# Patient Record
Sex: MALE | URBAN - METROPOLITAN AREA
[De-identification: names, ages, dates, MRNs, and addresses within clinical notes are randomized per-mention and may not be internally consistent; named-entity substitution may affect disease eponyms.]

---

## 2020-06-01 ENCOUNTER — OUT OF OFFICE VISIT (OUTPATIENT)
Dept: URBAN - METROPOLITAN AREA MEDICAL CENTER 23 | Facility: MEDICAL CENTER | Age: 55
End: 2020-06-01
Payer: COMMERCIAL

## 2020-06-01 DIAGNOSIS — R93.2 ABNORMAL CHOLANGIOGRAM: ICD-10-CM

## 2020-06-01 DIAGNOSIS — Z99.2 DEPENDENCE ON RENAL DIALYSIS: ICD-10-CM

## 2020-06-01 DIAGNOSIS — K83.01 PSC (PRIMARY SCLEROSING CHOLANGITIS): ICD-10-CM

## 2020-06-01 DIAGNOSIS — N18.6 ANEMIA DUE TO CHRONIC KIDNEY DISEASE, ON CHRONIC DIALYSIS: ICD-10-CM

## 2020-06-01 PROCEDURE — 99232 SBSQ HOSP IP/OBS MODERATE 35: CPT | Performed by: PHYSICIAN ASSISTANT

## 2020-07-20 ENCOUNTER — DASHBOARD ENCOUNTERS (OUTPATIENT)
Age: 55
End: 2020-07-20

## 2020-07-22 ENCOUNTER — WEB ENCOUNTER (OUTPATIENT)
Dept: URBAN - METROPOLITAN AREA CLINIC 54 | Facility: CLINIC | Age: 55
End: 2020-07-22

## 2020-07-22 ENCOUNTER — OFFICE VISIT (OUTPATIENT)
Dept: URBAN - METROPOLITAN AREA CLINIC 54 | Facility: CLINIC | Age: 55
End: 2020-07-22
Payer: COMMERCIAL

## 2020-07-22 DIAGNOSIS — K83.1 BILIARY STRICTURE: ICD-10-CM

## 2020-07-22 DIAGNOSIS — K83.01 PRIMARY SCLEROSING CHOLANGITIS: ICD-10-CM

## 2020-07-22 PROBLEM — 197441003 PRIMARY SCLEROSING CHOLANGITIS: Status: ACTIVE | Noted: 2020-07-22

## 2020-07-22 PROCEDURE — 3017F COLORECTAL CA SCREEN DOC REV: CPT | Performed by: INTERNAL MEDICINE

## 2020-07-22 PROCEDURE — G9903 PT SCRN TBCO ID AS NON USER: HCPCS | Performed by: INTERNAL MEDICINE

## 2020-07-22 PROCEDURE — G8417 CALC BMI ABV UP PARAM F/U: HCPCS | Performed by: INTERNAL MEDICINE

## 2020-07-22 PROCEDURE — 99202 OFFICE O/P NEW SF 15 MIN: CPT | Performed by: INTERNAL MEDICINE

## 2020-07-22 PROCEDURE — G8427 DOCREV CUR MEDS BY ELIG CLIN: HCPCS | Performed by: INTERNAL MEDICINE

## 2020-07-22 RX ORDER — INSULIN GLARGINE 100 [IU]/ML
AS DIRECTED INJECTION, SOLUTION SUBCUTANEOUS
Status: ACTIVE | COMMUNITY

## 2020-07-22 RX ORDER — PATIROMER 8.4 G/1
1 PACKET POWDER, FOR SUSPENSION ORAL
Status: ACTIVE | COMMUNITY

## 2020-07-22 RX ORDER — ISOSORBIDE MONONITRATE 10 MG/1
1 TABLET TABLET ORAL TWICE A DAY
Status: ACTIVE | COMMUNITY

## 2020-07-22 RX ORDER — SEVELAMER CARBONATE 800 MG/1
1 TABLET WITH MEALS TABLET, FILM COATED ORAL THREE TIMES A DAY
Status: ACTIVE | COMMUNITY

## 2020-07-22 RX ORDER — PANTOPRAZOLE SODIUM 40 MG/1
1 TABLET TABLET, DELAYED RELEASE ORAL ONCE A DAY
Status: ACTIVE | COMMUNITY

## 2020-07-22 RX ORDER — CARVEDILOL 12.5 MG/1
1 TABLET WITH FOOD TABLET, FILM COATED ORAL TWICE A DAY
Status: ACTIVE | COMMUNITY

## 2020-07-22 RX ORDER — MULTIVITAMIN WITH IRON
1 CAPSULE TABLET ORAL ONCE A DAY
Status: ACTIVE | COMMUNITY

## 2020-07-22 RX ORDER — BUMETANIDE 0.5 MG/1
1 TABLET PO TABLET ORAL BID
Status: ACTIVE | COMMUNITY

## 2020-07-22 RX ORDER — TURMERIC 400 MG
AS DIRECTED CAPSULE ORAL
Status: ACTIVE | COMMUNITY

## 2020-07-22 RX ORDER — AMLODIPINE BESYLATE 5 MG/1
1 TABLET TABLET ORAL ONCE A DAY
Status: ACTIVE | COMMUNITY

## 2020-07-22 RX ORDER — INSULIN LISPRO 100 [IU]/ML
AS DIRECTED INJECTION, SOLUTION INTRAVENOUS; SUBCUTANEOUS
Status: ACTIVE | COMMUNITY

## 2020-07-22 RX ORDER — URSODIOL 500 MG/1
1 TABLET TABLET ORAL THREE TIMES A DAY
Qty: 270 TABLET | Refills: 2 | OUTPATIENT
Start: 2020-07-22

## 2020-07-22 RX ORDER — PERPHENAZINE 2 MG
AS DIRECTED TABLET ORAL
Status: ACTIVE | COMMUNITY

## 2020-07-22 NOTE — HPI-OTHER HISTORIES
5/2020 hospital: HOSPITAL COURSE No notes on file * Severe sepsis (CMS/HCC)- (present on admission) Assessment & Plan Likely multi-factorial   Infected HD access as well as osteomyelitis and newly noted PSC   Continue with IV abx therapy per ID   Hyperbilirubinemia- (present on admission) Assessment & Plan Cholestatic hepatitis I suspect that medications maybe contributing in addition to bacteremia Of note, rocephin may contribute to microlithiasis as well Would plan for EUS with possible ERCP to further evaluate for obstructive etiology  If neg EUS perc C tube and liver bx for DILI    5.31.2020 S/p EUS and ERCP: 1.  Dominant stricture noted in the common bile duct in the lower end of the bile duct 2.  Multiple intrahepatic ductal strictures noted suggestive of a primary sclerosing cholangitis 3.  Pruning of the intrahepatic ducts noted suggestive of a primary sclerosing cholangitis 4.  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  Ampullary mass noted status post fine-needle aspiration was done which is about 1.5 cm x 1 cm 2.  Common bile duct is very narrow with a stricture in the common bile duct noted in the lower end of the common bile duct 3.  few lymph nodes of 5 mm and 6 mm noted in the peripancreatic area next of the head of the pancreas   In the setting of possible PSC, start DOLORES Continue with abx, and likely source of fevers/elevated LFTs multifactorial now with possible PSC Discussed with patient EUS/ERCP findings and need to follow with us on DC   Await bx results of ampullary mass Continue with abx as current   ESRD (end stage renal disease) on dialysis (CMS/HCC) Assessment & Plan complicated by MRSA bacteremia   -Continue hemodialysis per kidney care nephrology -Patient with PermCath in place -Patient is also status post right upper extremity fistula placement by [facility] vascular surgery on 5/27/2020   Osteomyelitis of left foot (CMS/HCC)- (present on admission) Assessment & Plan -This was also noted on a MRI back in 2019 -Suspect this is a chronic osteomyelitis of this area -Patient normally follows with Dr. Talbot of podiatry, unfortunately Dr. Talbot does not come to this hospital -Patient has been evaluated by the on-call podiatrist here, however the patient refuses to be operated on by anyone other than his primary podiatrist -Per the patient, this is been a chronic wound, that he feels has been improving over the last several months -Plan to continue local wound care -Follow-up with outpatient podiatrist on discharge   Cholecystitis, acute- (present on admission) Assessment & Plan Findings concerning for acalculous cholecystitis  Worsening leukocytosis Profound cholestasis   5.31.2020 Suspect elevated LFTs as well as fevers/cholestatic picture from newly noted likely PSC as findings on EUS/ERCP Would hold on further GB surgical intervention at this time   MRSA bacteremia- (present on admission) Assessment & Plan On broad spectrum abx therapy, s/p removal of previous HD access, now with Vasc cath

## 2020-07-22 NOTE — HPI-TODAY'S VISIT:
Follow-up of PSG with dominant biliary stricture and placement of 7 [place] stent on 5/30/2020.  He was jaundiced at that time and septic from a dialysis catheter.  Jaundice has resolved.  He was on ursodiol in the hospital but is not taking it at this time.  He denies fever or abdominal pain.  He is eating well.  Other problems include diabetes and chronic kidney disease on hemodialysis.  He has no active cardiac or respiratory problems.  Biopsies of a suspected ampullary mass revealed no cancer.  He has no ongoing infections.

## 2020-07-23 LAB
A/G RATIO: 0.9
ALBUMIN: 3.7
ALKALINE PHOSPHATASE: 436
ALT (SGPT): 8
AST (SGOT): 23
BASO (ABSOLUTE): 0.1
BASOS: 1
BILIRUBIN, TOTAL: 0.9
BUN/CREATININE RATIO: 11
BUN: 83
CALCIUM: 8.6
CARBON DIOXIDE, TOTAL: 22
CHLORIDE: 89
CREATININE: 7.84
EGFR IF AFRICN AM: 8
EGFR IF NONAFRICN AM: 7
EOS (ABSOLUTE): 0.2
EOS: 2
GLOBULIN, TOTAL: 4
GLUCOSE: 245
HEMATOCRIT: 31.9
HEMATOLOGY COMMENTS:: (no result)
HEMOGLOBIN: 10.4
IMMATURE CELLS: (no result)
IMMATURE GRANS (ABS): 0.1
IMMATURE GRANULOCYTES: 1
LYMPHS (ABSOLUTE): 0.9
LYMPHS: 8
MCH: 31.1
MCHC: 32.6
MCV: 96
MONOCYTES(ABSOLUTE): 1.4
MONOCYTES: 13
NEUTROPHILS (ABSOLUTE): 8.2
NEUTROPHILS: 75
NRBC: (no result)
PLATELETS: 350
POTASSIUM: 4.8
PROTEIN, TOTAL: 7.7
RBC: 3.34
RDW: 15.3
SODIUM: 136
WBC: 10.8

## 2020-08-17 ENCOUNTER — OFFICE VISIT (OUTPATIENT)
Dept: URBAN - METROPOLITAN AREA CLINIC 115 | Facility: CLINIC | Age: 55
End: 2020-08-17

## 2021-01-11 ENCOUNTER — TELEPHONE ENCOUNTER (OUTPATIENT)
Dept: URBAN - METROPOLITAN AREA CLINIC 54 | Facility: CLINIC | Age: 56
End: 2021-01-11

## 2021-04-28 ENCOUNTER — TELEPHONE ENCOUNTER (OUTPATIENT)
Dept: URBAN - METROPOLITAN AREA CLINIC 98 | Facility: CLINIC | Age: 56
End: 2021-04-28

## 2022-03-07 ENCOUNTER — OUT OF OFFICE VISIT (OUTPATIENT)
Dept: URBAN - METROPOLITAN AREA MEDICAL CENTER 23 | Facility: MEDICAL CENTER | Age: 57
End: 2022-03-07
Payer: COMMERCIAL

## 2022-03-07 DIAGNOSIS — R10.11 ABDOMINAL BURNING SENSATION IN RIGHT UPPER QUADRANT: ICD-10-CM

## 2022-03-07 DIAGNOSIS — N18.6 ANEMIA DUE TO CHRONIC KIDNEY DISEASE, ON CHRONIC DIALYSIS: ICD-10-CM

## 2022-03-07 DIAGNOSIS — Z99.2 DEPENDENCE ON PERITONEAL DIALYSIS: ICD-10-CM

## 2022-03-07 PROCEDURE — G8427 DOCREV CUR MEDS BY ELIG CLIN: HCPCS | Performed by: INTERNAL MEDICINE

## 2022-03-07 PROCEDURE — 99223 1ST HOSP IP/OBS HIGH 75: CPT | Performed by: INTERNAL MEDICINE

## 2022-03-08 ENCOUNTER — OUT OF OFFICE VISIT (OUTPATIENT)
Dept: URBAN - METROPOLITAN AREA MEDICAL CENTER 23 | Facility: MEDICAL CENTER | Age: 57
End: 2022-03-08
Payer: COMMERCIAL

## 2022-03-08 DIAGNOSIS — K80.50 BILE DUCT CALCULUS: ICD-10-CM

## 2022-03-08 PROCEDURE — 74328 X-RAY BILE DUCT ENDOSCOPY: CPT | Performed by: INTERNAL MEDICINE

## 2022-03-08 PROCEDURE — 43264 ERCP REMOVE DUCT CALCULI: CPT | Performed by: INTERNAL MEDICINE

## 2022-03-09 ENCOUNTER — OUT OF OFFICE VISIT (OUTPATIENT)
Dept: URBAN - METROPOLITAN AREA MEDICAL CENTER 23 | Facility: MEDICAL CENTER | Age: 57
End: 2022-03-09
Payer: COMMERCIAL

## 2022-03-09 DIAGNOSIS — R10.11 ABDOMINAL BURNING SENSATION IN RIGHT UPPER QUADRANT: ICD-10-CM

## 2022-03-09 DIAGNOSIS — K83.1 AMPULLA OF VATER OBSTRUCTION SYNDROME: ICD-10-CM

## 2022-03-09 DIAGNOSIS — K83.09 ACUTE CHOLANGITIS: ICD-10-CM

## 2022-03-09 DIAGNOSIS — D72.829 ELEVATED WBC COUNT: ICD-10-CM

## 2022-03-09 PROCEDURE — 99232 SBSQ HOSP IP/OBS MODERATE 35: CPT | Performed by: PHYSICIAN ASSISTANT

## 2022-11-05 ENCOUNTER — OUT OF OFFICE VISIT (OUTPATIENT)
Dept: URBAN - METROPOLITAN AREA MEDICAL CENTER 23 | Facility: MEDICAL CENTER | Age: 57
End: 2022-11-05
Payer: COMMERCIAL

## 2022-11-05 DIAGNOSIS — R74.8 ABNORMAL ALKALINE PHOSPHATASE TEST: ICD-10-CM

## 2022-11-05 PROCEDURE — G8427 DOCREV CUR MEDS BY ELIG CLIN: HCPCS | Performed by: STUDENT IN AN ORGANIZED HEALTH CARE EDUCATION/TRAINING PROGRAM

## 2022-11-05 PROCEDURE — 99222 1ST HOSP IP/OBS MODERATE 55: CPT | Performed by: STUDENT IN AN ORGANIZED HEALTH CARE EDUCATION/TRAINING PROGRAM
